# Patient Record
(demographics unavailable — no encounter records)

---

## 2025-03-03 NOTE — HISTORY OF PRESENT ILLNESS
[Dull/Aching] : dull/aching [Localized] : localized [Tightness] : tightness [de-identified] :  03/03/2025: She is for evaluation of 6+ month history of worsening left knee pain.  She denies injury states pain is now constant but is worse with stairs and start up.  She has tried ibuprofen without help and she is known to walk with a cane as a baseline.  She states prior history of CRPS after a situation/surgery on her right lower extremity several years ago [] : no [FreeTextEntry1] : LFT knee  [FreeTextEntry5] : LFT knee pain that developed a few months ago. Denies prior treatment.

## 2025-03-03 NOTE — PHYSICAL EXAM
[Left] : left knee [NL (0)] : extension 0 degrees [5___] : hamstring 5[unfilled]/5 [Positive] : positive Susan [] : negative Lachmann [FreeTextEntry8] : She is hypersensitive to touch/palpation and exam is limited due to her guarding and pain [TWNoteComboBox7] : flexion 90 degrees

## 2025-03-03 NOTE — ASSESSMENT
[FreeTextEntry1] : - Prescription for meloxicam is provided  The patient's orthopaedic condition(s) warrants intermittent use of a prescription strength non-steroidal anti-inflammatory medication.  These medications are associated with risks including but not limited to gastrointestinal irritation, kidney damage, hypertension, and bleeding.  The patient understands and will take medications as prescribed.  The patient will stop the medication and consult a physician as needed if problems arise. -Prescription for physical therapy is provided  Will hold off on any invasive treatment such as cortisone or HA injections due to her history of CRPS

## 2025-04-14 NOTE — HISTORY OF PRESENT ILLNESS
[Dull/Aching] : dull/aching [Localized] : localized [Tightness] : tightness [de-identified] : 04/14/2025 Doing PT for left knee which has been helping 03/03/2025: She is for evaluation of 6+ month history of worsening left knee pain.  She denies injury states pain is now constant but is worse with stairs and start up.  She has tried ibuprofen without help and she is known to walk with a cane as a baseline.  She states prior history of CRPS after a situation/surgery on her right lower extremity several years ago [] : no [FreeTextEntry1] : LFT knee  [FreeTextEntry5] : LFT knee pain that developed a few months ago. Denies prior treatment.

## 2025-04-14 NOTE — PHYSICAL EXAM
[Left] : left knee [NL (0)] : extension 0 degrees [5___] : hamstring 5[unfilled]/5 [Equivocal] : equivocal Susan [] : light touch is intact throughout [FreeTextEntry8] : improving [TWNoteComboBox7] : flexion 120 degrees
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
## 2025-05-10 NOTE — DISCUSSION/SUMMARY
[de-identified] : add PT for right knee offered CSI Meloxicam as reviewed cryotherapy return 2-3 weeks with Dr. Murray
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
## 2025-05-10 NOTE — IMAGING
[Right] : right knee [AP] : anteroposterior [Lateral] : lateral [[place]] : skyline [There are no fractures, subluxations or dislocations. No significant abnormalities are seen] : There are no fractures, subluxations or dislocations. No significant abnormalities are seen [Degenerative change] : Degenerative change [FreeTextEntry9] : mild medial and moderate PF OA

## 2025-05-10 NOTE — DISCUSSION/SUMMARY
[de-identified] : add PT for right knee offered CSI Meloxicam as reviewed cryotherapy return 2-3 weeks with Dr. Murray

## 2025-05-10 NOTE — IMAGING
[Right] : right knee [AP] : anteroposterior [Lateral] : lateral [[place]] : skyline [There are no fractures, subluxations or dislocations. No significant abnormalities are seen] : There are no fractures, subluxations or dislocations. No significant abnormalities are seen [Degenerative change] : Degenerative change [FreeTextEntry9] : mild medial and moderate PF OA

## 2025-05-10 NOTE — ASSESSMENT
[FreeTextEntry1] : 58F with flare up R knee OA/ pain  currently doing PT for her L knee, will add PT for right knee to improve pain, restore strength and function offered CSI, will hold and see how she does with PT Meloxicam with GI precautions daily x 7-10 days then prn cryotherapy return 2-3 weeks with Dr. Murray for re-evaluation

## 2025-05-10 NOTE — PHYSICAL EXAM
[Right] : right knee [NL (0)] : extension 0 degrees [4___] : quadriceps 4[unfilled]/5 [Equivocal] : equivocal Susan [] : no lateral joint line tenderness [FreeTextEntry8] : calves supple, non tender [TWNoteComboBox7] : flexion 120 degrees

## 2025-05-12 NOTE — IMAGING
[Right] : right knee [AP] : anteroposterior [Lateral] : lateral [Outside films reviewed] : Outside films reviewed [Moderate patellofemoral OA] : Moderate patellofemoral OA
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
## 2025-05-12 NOTE — ASSESSMENT
[FreeTextEntry1] : May have insufficiency fracture tibia- needs MRI to r/o fracture If negative, she should consider CSI Ibuprofen, ice, has cane

## 2025-05-12 NOTE — PHYSICAL EXAM
[Right] : right knee [NL (0)] : extension 0 degrees [5___] : hamstring 5[unfilled]/5 [Positive] : positive Susan [] : negative Lachmann [TWNoteComboBox7] : flexion 110 degrees
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
## 2025-05-19 NOTE — ASSESSMENT
[FreeTextEntry1] : I reviewed the MRI of her right knee, has combination of arthritis and meniscal tears, baker cyst. Would not rush right into knee arthroscopy, may not get good result due to the amount of OA present Will inject right knee with cortisone and start course of PT and see how she progresses